# Patient Record
Sex: MALE
[De-identification: names, ages, dates, MRNs, and addresses within clinical notes are randomized per-mention and may not be internally consistent; named-entity substitution may affect disease eponyms.]

---

## 2022-01-10 PROBLEM — Z00.00 ENCOUNTER FOR PREVENTIVE HEALTH EXAMINATION: Status: ACTIVE | Noted: 2022-01-10

## 2022-01-14 ENCOUNTER — APPOINTMENT (OUTPATIENT)
Dept: PEDIATRIC ORTHOPEDIC SURGERY | Facility: CLINIC | Age: 22
End: 2022-01-14
Payer: MEDICAID

## 2022-01-14 DIAGNOSIS — M40.04 POSTURAL KYPHOSIS, THORACIC REGION: ICD-10-CM

## 2022-01-14 DIAGNOSIS — M54.50 LOW BACK PAIN, UNSPECIFIED: ICD-10-CM

## 2022-01-14 PROCEDURE — 99204 OFFICE O/P NEW MOD 45 MIN: CPT | Mod: 25

## 2022-01-14 PROCEDURE — 72082 X-RAY EXAM ENTIRE SPI 2/3 VW: CPT

## 2022-01-15 NOTE — DATA REVIEWED
[de-identified] : Scoliosis x-rays AP and lateral were done today, 1/14/22.  There is no obvious abnormality.  There is no significant curvature of the spine on AP x-ray.  The disc heights are maintained.  Sagittal alignment is maintained.  Coronal balance is maintained.  There no vertebral abnormalities that were noticed. No spondylolisthesis or spondylolysis noted.

## 2022-01-15 NOTE — HISTORY OF PRESENT ILLNESS
[FreeTextEntry1] : Nirav is a 21 year old M who is here today for evaluation of back pain x 6 months. He localizes his pain to his lower back which is exacerbated by activities including basketball, push ups and lifting. He describes this pain as an annoying pain and rates it a 4-5/10 and is intermittent. He reports mild relief with rest. He denies any numbness, tingling, radiation of pain, other joint pain, bruising, limp or change in weight bearing status, swelling or recent illnesses/fever. No bladder or bowel incontinence.

## 2022-01-15 NOTE — ASSESSMENT
[FreeTextEntry1] : Nirav is a 21 year old M with paraspinal lumbar back pain x 6 months and postural kyphosis\par \par The condition, natural history, and prognosis were explained to the patient. The clinical findings were reviewed at length and discussed with the patient. Clinically he has pain over the lumbar paraspinal muscles and has postural kyphosis.  We discussed the etiology, pathoanatomy, treatment modalities and expect natural history of the condition.  At this time we recommend PT for strengthening of the back and core muscle as well as massage therapy. PT script given today. He may take OTC NSAIDs, ice and heat for his back pain as needed. If his pain is not relieved with PT, he should RTC in 6-8 weeks for repeat clinical evaluation.  All questions and concerns were addressed today. Patient verbalized understanding and agree with plan of care.\par \par SHANNON, Salma Sanders PA-C, have acted as a scribe and documented the above information for Dr. Onofre.

## 2022-01-15 NOTE — PHYSICAL EXAM
[FreeTextEntry1] : GENERAL: alert, cooperative, in NAD\par SKIN: The skin is intact, warm, pink and dry over the area examined.\par EYES: Normal conjunctiva, normal eyelids and pupils were equal and round.\par ENT: normal ears, normal nose and normal lips.\par CARDIOVASCULAR: brisk capillary refill, but no peripheral edema.\par RESPIRATORY: The patient is in no apparent respiratory distress. They're taking full deep breaths without use of accessory muscles or evidence of audible wheezes or stridor without the use of a stethoscope. Normal respiratory effort.\par ABDOMEN: not examined.\par \par No obvious abnormalities in the upper or lower extremity. Full range of motion of the wrists, elbows, shoulders, ankles, knees, and hips. Full range of motion without tenderness of the neck. \par \par Inspection of the skin reveals no cafe au lait spots or large birth marks.\par Back examination reveals that the patient is well centered with head and shoulders aligned with pelvis. The hips, shoulders and scapulas are even. Amandeep forward bending test demonstrates a mild right thoracic paraspinal prominence. \par \par +mild TTP over the lower lumbar paraspinal muscles\par No other tenderness along spinous processes or para spinal musculature. Walks with coordination and balance. Able to squat, jump, heel and toe walk without difficulty. Full active range of motion of the back with flexion, extension, rotation, and lateral bending without discomfort or stiffness. \par \par Muscle strength is 5/5. Patellar and Achilles reflexes are +2 B/L. No clonus or Babinski. Superficial abdominal reflexes are present in all 4 quadrants. 2+ DP pulses B/L. No limb length discrepancy noted.\par \par \par  \par

## 2022-01-15 NOTE — REVIEW OF SYSTEMS
[Back Pain] : ~T back pain [Appropriate Age Development] : development appropriate for age [Change in Activity] : no change in activity [Fever Above 102] : no fever [Malaise] : no malaise [Rash] : no rash [Itching] : no itching [Eye Pain] : no eye pain [Redness] : no redness [Nasal Stuffiness] : no nasal congestion [Sore Throat] : no sore throat [Oral Ulcers] : no oral ulcers [Heart Problems] : no heart problems [Murmur] : no murmur [Tachypnea] : no tachypnea [Wheezing] : no wheezing [Congestion] : no congestion [Asthma] : no asthma [Change in Appetite] : no change in appetite [Vomiting] : no vomiting [Abdominal Pain] : no abdominal pain [Constipation] : no constipation [Kidney Infection] : no kidney infection [Bladder Infection] : no bladder infection [Pain During Urination] : no dysuria [Limping] : no limping [Joint Pains] : no arthralgias [Joint Swelling] : no joint swelling [AM Stiffness] : no am stiffness [Fainting] : no fainting

## 2022-01-15 NOTE — REASON FOR VISIT
[Initial Evaluation] : an initial evaluation [Patient] : patient [FreeTextEntry1] : Back pain x 6 months

## 2022-01-15 NOTE — END OF VISIT
[FreeTextEntry3] : \par Saw and examined patient and agree with plan with modifications.\par \par Nayla Onofre MD\par [facility]\par Pediatric Orthopedic Surgery\par